# Patient Record
Sex: MALE | Employment: FULL TIME | URBAN - METROPOLITAN AREA
[De-identification: names, ages, dates, MRNs, and addresses within clinical notes are randomized per-mention and may not be internally consistent; named-entity substitution may affect disease eponyms.]

---

## 2018-06-12 ENCOUNTER — HOSPITAL ENCOUNTER (OUTPATIENT)
Dept: GENERAL RADIOLOGY | Age: 38
Discharge: HOME | End: 2018-06-12
Attending: FAMILY MEDICINE
Payer: COMMERCIAL

## 2018-06-12 DIAGNOSIS — M25.562 LEFT KNEE PAIN: ICD-10-CM

## 2018-06-12 DIAGNOSIS — M25.569 PAIN IN JOINT, LOWER LEG: ICD-10-CM

## 2018-06-12 PROCEDURE — 73562 X-RAY EXAM OF KNEE 3: CPT

## 2018-10-01 PROBLEM — M10.9 GOUT: Status: ACTIVE | Noted: 2018-10-01

## 2018-10-01 PROBLEM — E66.9 OBESITY: Status: ACTIVE | Noted: 2018-10-01

## 2018-10-01 PROBLEM — E11.9 TYPE II DIABETES MELLITUS (HCC): Status: ACTIVE | Noted: 2018-10-01

## 2018-10-01 RX ORDER — METFORMIN HYDROCHLORIDE 500 MG/1
TABLET ORAL 2 TIMES DAILY WITH MEALS
COMMUNITY
End: 2018-11-09 | Stop reason: SDUPTHER

## 2018-10-03 ENCOUNTER — OFFICE VISIT (OUTPATIENT)
Dept: FAMILY MEDICINE CLINIC | Age: 38
End: 2018-10-03

## 2018-10-03 VITALS
BODY MASS INDEX: 44.32 KG/M2 | OXYGEN SATURATION: 95 % | HEIGHT: 65 IN | TEMPERATURE: 98 F | SYSTOLIC BLOOD PRESSURE: 125 MMHG | WEIGHT: 266 LBS | HEART RATE: 86 BPM | DIASTOLIC BLOOD PRESSURE: 78 MMHG | RESPIRATION RATE: 20 BRPM

## 2018-10-03 DIAGNOSIS — M10.9 ACUTE GOUT INVOLVING TOE OF RIGHT FOOT, UNSPECIFIED CAUSE: Primary | ICD-10-CM

## 2018-10-03 DIAGNOSIS — E11.9 CONTROLLED TYPE 2 DIABETES MELLITUS WITHOUT COMPLICATION, WITHOUT LONG-TERM CURRENT USE OF INSULIN (HCC): ICD-10-CM

## 2018-10-03 PROBLEM — E66.01 OBESITY, MORBID (HCC): Status: ACTIVE | Noted: 2018-10-03

## 2018-10-03 RX ORDER — PREDNISONE 5 MG/1
TABLET ORAL
Qty: 21 TAB | Refills: 0 | Status: SHIPPED | OUTPATIENT
Start: 2018-10-03 | End: 2018-10-03 | Stop reason: SDUPTHER

## 2018-10-03 RX ORDER — INDOMETHACIN 50 MG/1
CAPSULE ORAL
Refills: 0 | COMMUNITY
Start: 2018-09-19 | End: 2018-10-03 | Stop reason: ALTCHOICE

## 2018-10-03 RX ORDER — PREDNISONE 5 MG/1
TABLET ORAL
Qty: 21 TAB | Refills: 0 | Status: SHIPPED | OUTPATIENT
Start: 2018-10-03 | End: 2018-11-09 | Stop reason: ALTCHOICE

## 2018-10-03 NOTE — PROGRESS NOTES
Assessment/Plan:     Diagnoses and all orders for this visit:    1. Acute gout involving toe of right foot, unspecified cause  -     URIC ACID  -     predniSONE (DELTASONE) 5 mg tablet; Day 1: 30 mg divided as 10 mg before breakfast, 5 mg at lunch, 5 mg at dinner, 10 mg at bedtime  Day 2: 5 mg at breakfast, 5 mg at lunch, 5 mg at dinner, 10 mg at bedtime  Day 3: 5 mg 4 times daily (with meals and at bedtime)  Day 4: 5 mg 3 times daily (breakfast, lunch, bedtime)  Day 5: 5 mg 2 time  - Worsening, start Prednisone taper. Check blood sugar at home. Eat gout friendly diet and avoid alcohol. Follow up in 1 month. 2. Controlled type 2 diabetes mellitus without complication, without long-term current use of insulin (HCC)  -     METABOLIC PANEL, BASIC  -     HEMOGLOBIN A1C WITH EAG  - Follow up in 1 month for diabetes education. Check blood sugar at home and take the metformin      Follow-up Disposition:  Return in about 4 weeks (around 10/31/2018) for Follow Up. Discussed expected course/resolution/complications of diagnosis in detail with patient.    Medication risks/benefits/costs/interactions/alternatives discussed with patient.    Pt was given after visit summary which includes diagnoses, current medications & vitals. Pt expressed understanding with the diagnosis and plan        Subjective:      Sandra Jenkins is a 41 y.o. male who presents for had concerns including Toe Pain. Here today for pain in right toe for about a month that is not getting better. [facility] and [facility]  Has a history of gout, this is the second episode. Denies an injury. Has a history of Type II diabetes but stopped taking metformin has he states he felt blood sugar was too low. Admits to not routinely checking blood sugar. Admits to some alcohol use prior and during this episode of pain in his toe.       Current Outpatient Prescriptions   Medication Sig Dispense Refill    predniSONE (DELTASONE) 5 mg tablet Day 1: 30 mg divided as 10 mg before breakfast, 5 mg at lunch, 5 mg at dinner, 10 mg at bedtime  Day 2: 5 mg at breakfast, 5 mg at lunch, 5 mg at dinner, 10 mg at bedtime  Day 3: 5 mg 4 times daily (with meals and at bedtime)  Day 4: 5 mg 3 times daily (breakfast, lunch, bedtime)  Day 5: 5 mg 2 time 21 Tab 0    metFORMIN (GLUCOPHAGE) 500 mg tablet Take  by mouth two (2) times daily (with meals). No Known Allergies    ROS:   Review of Systems   Constitutional: Negative for fever. Respiratory: Negative for shortness of breath. Cardiovascular: Negative for chest pain. Musculoskeletal: Positive for joint pain. Neurological: Negative for dizziness and headaches. Endo/Heme/Allergies: Negative for polydipsia. Objective:     Visit Vitals    /78    Pulse 86    Temp 98 °F (36.7 °C)    Resp 20    Ht 5' 5\" (1.651 m)    Wt 266 lb (120.7 kg)    SpO2 95%    BMI 44.26 kg/m2       Vitals and Nurse Documentation reviewed. Physical Exam   Constitutional: He is well-developed, well-nourished, and in no distress. No distress. HENT:   Head: Normocephalic and atraumatic. Cardiovascular: Normal rate, regular rhythm and normal heart sounds. Exam reveals no gallop and no friction rub. No murmur heard. Pulmonary/Chest: Effort normal and breath sounds normal. No respiratory distress. He has no wheezes. He has no rales. Musculoskeletal:        Feet:    Right great toe, is erythematous and TTP   Neurological: He is alert. Skin: He is not diaphoretic. Psychiatric: Affect normal.       No results found for this or any previous visit.

## 2018-10-03 NOTE — PROGRESS NOTES
Dave Romero is a 41 y.o. male  Chief Complaint   Patient presents with    Toe Pain     Gout f/u     Went to [facility] 2 weeks ago. 1. Have you been to the ER, urgent care clinic since your last visit? Hospitalized since your last visit? Yes  [facility]    2. Have you seen or consulted any other health care providers outside of the [address] [place] [address] since your last visit? Include any pap smears or colon screening.  no

## 2018-10-03 NOTE — PATIENT INSTRUCTIONS
Gota: Instrucciones de cuidado - [ Gout: Care Instructions ]  Instrucciones de cuidado    La gota es kevin forma de artritis causada por la acumulación de juliet de ácido úrico en kevin articulación. Causa ataques repentinos de dolor, hinchazón, enrojecimiento y rigidez, por lo general en kevin articulación, sobre todo en el dedo justyn del pie. La gota usualmente se presenta sin kevin causa evidente. Alan puede desencadenarse debido al consumo de alcohol (especialmente cerveza), mariscos y radha lin. Destiny ciertos medicamentos, adina diuréticos o aspirina, también puede ocasionar un ataque de [place]. Destiny los medicamentos roxy adina se los receten y asistir con regularidad a las consultas de seguimiento con tran médico pueden ayudarle a evitar ataques de gota en el futuro. La atención de seguimiento es kevin parte clave de tran tratamiento y seguridad. Asegúrese de hacer y acudir a todas las citas, y llame a tran médico si está teniendo problemas. También es kevin buena idea saber los resultados de irving exámenes y mantener kevin lista de los medicamentos que nova. ¿Cómo puede cuidarse en el hogar? · Si la articulación está hinchada, colóquese hielo o kevin compresa fría en la vianey brown 10 a 20 minutos cada vez. Póngase un paño conti entre el hielo y la piel. · Eleve el miembro adolorido sobre kevin almohada mientras se aplica hielo, o en cualquier momento que se siente o se acueste brown los 3 días siguientes. Trate de mantenerlo por encima del nivel del corazón. [place] ayudará a reducir la hinchazón. · Descanse las articulaciones adoloridas. Evite las actividades que impliquen poner peso o esfuerzo sobre las articulaciones por unos ori. [place] breves descansos en irving actividades regulares brown el día. · [employer] medicamentos exactamente adina le fueron recetados. Llame a tran médico si lebron estar teniendo problemas con tran medicamento.   · [employer] analgésicos (medicamentos para el dolor) exactamente según las indicaciones. ¨ Si el médico le recetó un analgésico, tómelo según las indicaciones. ¨ Si no está tomando un analgésico recetado, pregúntele a tran médico si puede gama horacio de [employer]. · Coma menos mariscos y radha lin. · Pregúntele a tran médico antes de gama alcohol. · Perder peso, si tiene sobrepeso, podría ayudarle a reducir los ataques de [place]. Alan no jose kevin [place]. La pérdida de un peso considerable en corto tiempo puede provocar un ataque de [place]. ¿Cuándo debe pedir ayuda? Llame a tran médico ahora mismo o busque atención médica inmediata si:    · Tiene fiebre.     · La articulación le duele tanto que no puede usarla.     · Tiene repentina e inexplicable hinchazón, enrojecimiento, calor o dolor intenso en kevin o más articulaciones.    Preste especial atención a los cambios en tran china y asegúrese de comunicarse con tran médico si:    · Tiene dolor en kevin articulación.     · Lovely síntomas empeoran o no mejoran después de 2 ó 3 días. ¿Dónde puede encontrar más información en inglés? Alex List a http://cindy-junito.info/. Hunter Henley [id number] en la búsqueda para aprender más acerca de \"Gota: Instrucciones de cuidado - [ Gout: Care Instructions ]. \"  Revisado: 10 octubre, 2017  Versión del contenido: 11.7  © 4926-9364 Healthwise, Incorporated. Las instrucciones de cuidado fueron adaptadas bajo licencia por [employer] (which disclaims liability or warranty for this information). Si usted tiene [place] afección médica o sobre estas instrucciones, siempre pregunte a tran profesional de [place]. Healthwise, Incorporated niega toda garantía o responsabilidad por tran uso de esta información.

## 2018-10-03 NOTE — MR AVS SNAPSHOT
[address] [place] [address] 
[phone] Patient: Priscila Andrea MRN: [id number] WVM:[id number] Visit Information Date & Time Provider Department Dept. Phone Encounter #  
 10/3/2018  8:30 AM Abhi Acuña NP [facility] Medicine [phone] [id number] Follow-up Instructions Return in about 4 weeks (around 10/31/2018) for Follow Up. Upcoming Health Maintenance Date Due HEMOGLOBIN A1C Q6M 1981 LIPID PANEL Q1 1981 FOOT EXAM Q1 5/30/1991 MICROALBUMIN Q1 5/30/1991 EYE EXAM RETINAL OR DILATED Q1 5/30/1991 Pneumococcal 19-64 Medium Risk (1 of 1 - PPSV23) 5/30/2000 DTaP/Tdap/Td series (1 - Tdap) 5/30/2002 Influenza Age 5 to Adult 8/1/2018 Allergies as of 10/3/2018  Review Complete On: 10/3/2018 By: Abhi Acuña NP No Known Allergies Current Immunizations  Never Reviewed No immunizations on file. Not reviewed this visit You Were Diagnosed With   
  
 Codes Comments Acute gout involving toe of right foot, unspecified cause    -  Primary ICD-10-CM: M10.9 ICD-9-CM: 274.01 Controlled type 2 diabetes mellitus without complication, without long-term current use of insulin ([facility].)     ICD-10-CM: E11.9 ICD-9-CM: 250.00 Vitals BP Pulse Temp Resp Height(growth percentile) Weight(growth percentile) 125/78 86 98 °F (36.7 °C) 20 5' 5\" (1.651 m) 266 lb (120.7 kg) SpO2 BMI Smoking Status 95% 44.26 kg/m2 Never Smoker BMI and BSA Data Body Mass Index Body Surface Area  
 44.26 kg/m 2 2.35 m 2 Preferred Pharmacy Pharmacy Name Phone [id number] [place] - [phone] [phone] Your Updated Medication List  
  
   
This list is accurate as of 10/3/18  9:16 AM.  Always use your most recent med list.  
  
  
  
  
 metFORMIN 500 mg tablet Commonly known as:  GLUCOPHAGE Take  by mouth two (2) times daily (with meals). predniSONE 5 mg tablet Commonly known as:  Arnie Osgood Day 1: 30 mg divided as 10 mg before breakfast, 5 mg at lunch, 5 mg at dinner, 10 mg at bedtime Day 2: 5 mg at breakfast, 5 mg at lunch, 5 mg at dinner, 10 mg at bedtime Day 3: 5 mg 4 times daily (with meals and at bedtime) Day 4: 5 mg 3 times daily (breakfast, lunch, bedtime) Day 5: 5 mg 2 time Prescriptions Printed Refills  
 predniSONE (DELTASONE) 5 mg tablet 0 Sig: Day 1: 30 mg divided as 10 mg before breakfast, 5 mg at lunch, 5 mg at dinner, 10 mg at bedtime Day 2: 5 mg at breakfast, 5 mg at lunch, 5 mg at dinner, 10 mg at bedtime Day 3: 5 mg 4 times daily (with meals and at bedtime) Day 4: 5 mg 3 times daily (breakfast, lunch, bedtime) Day 5: 5 mg 2 time Class: Print We Performed the Following HEMOGLOBIN A1C WITH EAG [37937 CPT(R)] METABOLIC PANEL, BASIC [58970 CPT(R)] URIC ACID [id number] CPT(R)] Follow-up Instructions Return in about 4 weeks (around 10/31/2018) for Follow Up. Patient Instructions Gota: Instrucciones de cuidado - [ Gout: Care Instructions ] Instrucciones de cuidado La gota es kevin forma de artritis causada por la acumulación de juliet de ácido úrico en kevin articulación. Causa ataques repentinos de dolor, hinchazón, enrojecimiento y rigidez, por lo general en kevin articulación, sobre todo en el dedo justyn del pie. La gota usualmente se presenta sin kevin causa evidente. Alan puede desencadenarse debido al consumo de alcohol (especialmente cerveza), mariscos y radha lin. Destiny ciertos medicamentos, adina diuréticos o aspirina, también puede ocasionar un ataque de [place]. Destiny los medicamentos roxy adina se los receten y asistir con regularidad a las consultas de seguimiento con tran médico pueden ayudarle a evitar ataques de gota en el futuro. La atención de seguimiento es kevin parte clave de tran tratamiento y seguridad. Asegúrese de hacer y acudir a todas las citas, y llame a tran médico si está teniendo problemas. También es kevin buena idea saber los resultados de irving exámenes y mantener kevin lista de los medicamentos que nova. Cómo puede cuidarse en el hogar? · Si la articulación está hinchada, colóquese hielo o kevin compresa fría en la vianey brown 10 a 20 minutos cada vez. Póngase un paño conti entre el hielo y la piel. · Eleve el miembro adolorido sobre kevin almohada mientras se aplica hielo, o en cualquier momento que se siente o se acueste brown los 3 días siguientes. Trate de mantenerlo por encima del nivel del corazón. [place] ayudará a reducir la hinchazón. · Descanse las articulaciones adoloridas. Evite las actividades que impliquen poner peso o esfuerzo sobre las articulaciones por unos ori. [place] breves descansos en irving actividades regulares brown el día. · [employer] medicamentos exactamente adina le fueron recetados. Llame a tran médico si lebron estar teniendo problemas con tran medicamento. · [employer] analgésicos (medicamentos para el dolor) exactamente según las indicaciones. ¨ Si el médico le recetó un analgésico, tómelo según las indicaciones. ¨ Si no está tomando un analgésico recetado, pregúntele a tran médico si puede gama horacio de [employer]. · Coma menos mariscos y radha lin. · Pregúntele a tran médico antes de gama alcohol. · Perder peso, si tiene sobrepeso, podría ayudarle a reducir los ataques de [place]. Alan no jose kevin [place]. La pérdida de un peso considerable en corto tiempo puede provocar un ataque de [place]. Cuándo debe pedir ayuda? Llame a tran médico ahora mismo o busque atención médica inmediata si: 
  · Tiene fiebre.  
  · La articulación le duele tanto que no puede usarla.  
  · Tiene repentina e inexplicable hinchazón, enrojecimiento, calor o dolor intenso en kevin o más articulaciones.  Preste especial atención a los cambios en tran china y asegúrese de comunicarse con tran médico si: 
  · Tiene dolor en kevin articulación.  
  · Lovely síntomas empeoran o no mejoran después de 2 ó 3 días. Dónde puede encontrar más información en inglés? Yue Benitez a http://cindy-junito.info/. Marge Seip [id number] en la búsqueda para aprender más acerca de \"Gota: Instrucciones de cuidado - [ Gout: Care Instructions ]. \" 
Revisado: 10 octubre, 2017 Versión del contenido: 11.7 © 1435-4409 Healthwise, Incorporated. Las instrucciones de cuidado fueron adaptadas bajo licencia por [employer] (which disclaims liability or warranty for this information). Si usted tiene [place] afección médica o sobre estas instrucciones, siempre pregunte a tran profesional de china. Healthwise, Incorporated niega toda garantía o responsabilidad por tran uso de esta información. Introducing [facility]! [facility] introduces Independent IP patient portal. Now you can access parts of your medical record, email your doctor's office, and request medication refills online. 1. In your internet browser, go to [email or web address] 2. Click on the First Time User? Click Here link in the Sign In box. You will see the New Member Sign Up page. 3. Enter your Independent IP Access Code exactly as it appears below. You will not need to use this code after youve completed the sign-up process. If you do not sign up before the expiration date, you must request a new code. · Independent IP Access Code: [email or web address] Expires: 12/10/2018  5:23 AM 
 
4. Enter the last four digits of your Social Security Number (xxxx) and Date of Birth (mm/dd/yyyy) as indicated and click Submit. You will be taken to the next sign-up page. 5. Create a Independent IP ID. This will be your PlayGigahart login ID and cannot be changed, so think of one that is secure and easy to remember. 6. Create a Treeveo password. You can change your password at any time. 7. Enter your Password Reset Question and Answer. This can be used at a later time if you forget your password. 8. Enter your e-mail address. You will receive e-mail notification when new information is available in [address] 9. Click Sign Up. You can now view and download portions of your medical record. 10. Click the Download Summary menu link to download a portable copy of your medical information. If you have questions, please visit the Frequently Asked Questions section of the Treeveo website. Remember, Treeveo is NOT to be used for urgent needs. For medical emergencies, dial 911. Now available from your iPhone and Android! Please provide this summary of care documentation to your next provider. Your primary care clinician is listed as Kamille Hedrick. If you have any questions after today's visit, please call [phone].

## 2018-10-04 LAB
BUN SERPL-MCNC: 13 MG/DL (ref 6–20)
BUN/CREAT SERPL: 15 (ref 9–20)
CALCIUM SERPL-MCNC: 9.7 MG/DL (ref 8.7–10.2)
CHLORIDE SERPL-SCNC: 101 MMOL/L (ref 96–106)
CO2 SERPL-SCNC: 24 MMOL/L (ref 20–29)
CREAT SERPL-MCNC: 0.88 MG/DL (ref 0.76–1.27)
EST. AVERAGE GLUCOSE BLD GHB EST-MCNC: 143 MG/DL
GLUCOSE SERPL-MCNC: 111 MG/DL (ref 65–99)
HBA1C MFR BLD: 6.6 % (ref 4.8–5.6)
POTASSIUM SERPL-SCNC: 4.9 MMOL/L (ref 3.5–5.2)
SODIUM SERPL-SCNC: 137 MMOL/L (ref 134–144)
URATE SERPL-MCNC: 9 MG/DL (ref 3.7–8.6)

## 2018-10-04 NOTE — PROGRESS NOTES
Please let patient know his uric acid level was high indicating Gout. His A1c was high so please start taking the Metformin again and lets draw labs in 3 months. Work on diet and exercise.

## 2018-10-05 NOTE — PROGRESS NOTES
Spoke with patient's wife on HIPAA and informed of lab results and to inform patient to begin to take metformin again. Wife verbalized an understanding and stated will inform patient.

## 2018-11-09 ENCOUNTER — OFFICE VISIT (OUTPATIENT)
Dept: FAMILY MEDICINE CLINIC | Age: 38
End: 2018-11-09

## 2018-11-09 VITALS
BODY MASS INDEX: 44.15 KG/M2 | TEMPERATURE: 98 F | OXYGEN SATURATION: 97 % | HEIGHT: 65 IN | HEART RATE: 71 BPM | SYSTOLIC BLOOD PRESSURE: 134 MMHG | WEIGHT: 265 LBS | RESPIRATION RATE: 20 BRPM | DIASTOLIC BLOOD PRESSURE: 83 MMHG

## 2018-11-09 DIAGNOSIS — M1A.4710 OTHER SECONDARY CHRONIC GOUT OF RIGHT FOOT WITHOUT TOPHUS: Primary | ICD-10-CM

## 2018-11-09 DIAGNOSIS — E11.9 CONTROLLED TYPE 2 DIABETES MELLITUS WITHOUT COMPLICATION, WITHOUT LONG-TERM CURRENT USE OF INSULIN (HCC): ICD-10-CM

## 2018-11-09 RX ORDER — COLCHICINE 0.6 MG/1
0.6 TABLET ORAL 2 TIMES DAILY
Qty: 14 TAB | Refills: 1 | Status: SHIPPED | OUTPATIENT
Start: 2018-11-09 | End: 2018-11-16

## 2018-11-09 RX ORDER — ALLOPURINOL 100 MG/1
TABLET ORAL
Qty: 291 TAB | Refills: 0 | Status: SHIPPED | OUTPATIENT
Start: 2018-11-09 | End: 2019-02-21

## 2018-11-09 RX ORDER — METFORMIN HYDROCHLORIDE 500 MG/1
500 TABLET ORAL 2 TIMES DAILY WITH MEALS
Qty: 180 TAB | Refills: 1 | Status: SHIPPED | OUTPATIENT
Start: 2018-11-09 | End: 2019-05-25 | Stop reason: SDUPTHER

## 2018-11-09 NOTE — PROGRESS NOTES
Priyank Anne is a 41 y.o. male      Chief Complaint   Patient presents with    Follow-up     lab rsults          1. Have you been to the ER, urgent care clinic since your last visit? Hospitalized since your last visit? No      2. Have you seen or consulted any other health care providers outside of the [address] [place] [address] since your last visit? Include any pap smears or colon screening. No    I reviewed the patient's medical history, the resident's findings on physical examination, the patient's diagnoses, and treatment plan as documented in the resident note. I concur with the treatment plan as documented. Additional suggestions noted.

## 2018-11-09 NOTE — PATIENT INSTRUCTIONS
Gota: Instrucciones de cuidado - [ Gout: Care Instructions ] Instrucciones de cuidado La gota es kevin forma de artritis causada por la acumulación de juliet de ácido úrico en kevin articulación. Causa ataques repentinos de dolor, hinchazón, enrojecimiento y rigidez, por lo general en kevin articulación, sobre todo en el dedo justyn del pie. La gota usualmente se presenta sin kevin causa evidente. Alan puede desencadenarse debido al consumo de alcohol (especialmente cerveza), mariscos y radha lin. Destiny ciertos medicamentos, adina diuréticos o aspirina, también puede ocasionar un ataque de [place]. Destiny los medicamentos roxy adina se los receten y asistir con regularidad a las consultas de seguimiento con tran médico pueden ayudarle a evitar ataques de gota en el futuro. La atención de seguimiento es kevin parte clave de tran tratamiento y seguridad. Asegúrese de hacer y acudir a todas las citas, y llame a tran médico si está teniendo problemas. También es kevin buena idea saber los resultados de irving exámenes y mantener kevin lista de los medicamentos que nova. Cómo puede cuidarse en el hogar? · Si la articulación está hinchada, colóquese hielo o kevin compresa fría en la vianey brown 10 a 20 minutos cada vez. Póngase un paño conti entre el hielo y la piel. · Eleve el miembro adolorido sobre kevin almohada mientras se aplica hielo, o en cualquier momento que se siente o se acueste brown los 3 días siguientes. Trate de mantenerlo por encima del nivel del corazón. [place] ayudará a reducir la hinchazón. · Descanse las articulaciones adoloridas. Evite las actividades que impliquen poner peso o esfuerzo sobre las articulaciones por unos ori. [place] breves descansos en irving actividades regulares borwn el día. · [employer] medicamentos exactamente adina le fueron recetados. Llame a tran médico si lebron estar teniendo problemas con tran medicamento.  
· [employer] analgésicos (medicamentos para el dolor) exactamente según las indicaciones. ? Si el médico le recetó un analgésico, tómelo según las indicaciones. ? Si no está tomando un analgésico recetado, pregúntele a salgado médico si puede gama horacio de [employer]. · Coma menos mariscos y radha lin. · Pregúntele a salgado médico antes de gama alcohol. · Perder peso, si tiene sobrepeso, podría ayudarle a reducir los ataques de [place]. Alan no jose kevin Mickey Ender. La pérdida de un peso considerable en corto tiempo puede provocar un ataque de [place]. Cuándo debe pedir ayuda? Llame a salgado médico ahora mismo o busque atención médica inmediata si: 
  · Tiene fiebre.  
  · La articulación le duele tanto que no puede usarla.  
  · Tiene repentina e inexplicable hinchazón, enrojecimiento, calor o dolor intenso en kevin o más articulaciones.  
 Preste especial atención a los cambios en salgado china y asegúrese de comunicarse con salgado médico si: 
  · Tiene dolor en kevin articulación.  
  · Lovely síntomas empeoran o no mejoran después de 2 ó 3 días. Dónde puede encontrar más información en inglés? Brandy Rodriguez a http://cindy-junito.info/. Tc Carmen G044 en la búsqueda para aprender más acerca de \"Gota: Instrucciones de cuidado - [ Gout: Care Instructions ]. \" 
Revisado: 11 junio, 2018 Versión del contenido: 11.8 © 8851-4152 Healthwise, Incorporated. Las instrucciones de cuidado fueron adaptadas bajo licencia por [employer] (which disclaims liability or warranty for this information). Si usted tiene [place] afección médica o sobre estas instrucciones, siempre pregunte a salgado profesional de china. Healthwise, Incorporated niega toda garantía o responsabilidad por salgado uso de esta información. Dieta restringida en purinas: Instrucciones de cuidado - [ Purine-Restricted Diet: Care Instructions ] Instrucciones de cuidado Vida Bruna son sustancias que se encuentran en ciertos alimentos.  Salgado cuerpo transforma las purinas en [facility] niv alto de Catina Sha puede causar gota, kevin forma de artritis que provoca dolor e inflamación en las articulaciones. Usted podría ayudar a controlar la cantidad de ácido úrico en tran cuerpo limitando los alimentos con alto contenido en purinas de tran Allen Solid. La atención de seguimiento es kevin parte clave de tran tratamiento y seguridad. Asegúrese de hacer y acudir a todas las citas, y llame a tran médico si está teniendo problemas. También es kevin buena idea saber los resultados de irving exámenes y mantener kevin lista de los medicamentos que nova. Cómo puede cuidarse en el hogar? · Planifique irving comidas y refrigerios con alimentos que maximus bajos en purinas y seguros para usted. Estos alimentos incluyen: ? Verduras verdes y tomates. ? Frutas. ? Bee, arroz y cereales integrales. ? Huevos, mantequilla de cacahuate (maní) y nueces. ? Elizabeth  y otros productos lácteos semidescremados. ? Palomitas de maíz (\"popcorn\"). ? Postres de gelatina, chocolate, cacao, así adina tartas y dulces en pequeñas cantidades. · Puede comer alimentos que maximus medianamente ricos en purinas, paulette solo de vez en cuando. Estos alimentos incluyen: 
? Legumbres, adina frijoles (habichuelas) secos y arvejas (chícharos) secas. Puede comer 1 taza de legumbres cocidas al día. ? Espárragos, coliflor, espinacas, hongos y arvejas (chícharos). ? Pescados y mariscos (que no maximus mariscos muy ricos en purinas). ? Mesha, salvado de jonnathan y germen de [place]. · Limite los alimentos muy ricos en purinas adina: 
? Vísceras, adina hígado, riñones, mollejas y sesos. ? Ingrid, incluyendo tocino, res, cerdo y hernandez. ? Ingrid de animales de caza y cualquier otro tipo de carne en grandes cantidades. ? Aminta Harrison, cole, jessenia y vieiras. ? Salsa de carne (\"gravy\"). ? Kisha Heading. Dónde puede encontrar más información en inglés? Onita Savers a http://cindy-junito.[email or web address]  
Escriba F448 en la búsqueda para aprender más acerca de \"Dieta restringida en purinas: Instrucciones de cuidado - [ Purine-Restricted Diet: Care Instructions ]. \" 
Revisado: [address], 2018 Versión del contenido: 11.8 © 4132-5095 Healthwise, Incorporated. Las instrucciones de cuidado fueron adaptadas bajo licencia por [employer] Connections (which disclaims liability or warranty for this information). Si usted tiene [place] afección médica o sobre estas instrucciones, siempre pregunte a salgado profesional de china. Healthwise, Incorporated niega toda garantía o responsabilidad por salgado uso de esta información. Alopurinol (Por la boca) Cony Drier y cálculos del [place]. Reduce la cantidad de ácido úrico en la last. Danial(s) : Zyloprim Existen muchas otras marcas de Due[place]. Margo medicamento no debe ser usado cuando:  
Margo medicamento no es adecuado para todas las personas. No lo use si ha tenido kevin reacción alérgica al alopurinol. [place] de usar magro medicamento:  
Louana Mani · Salgado médico le indicara cuanto medicamento necesita usar. No use más medicamento de lo indicado. · Siga tomando Jer, aunque parezca que no le hace efecto y usted esta tomando otros medicamentos para los ataques de la gota. Los ataques se irán acortando y serán menos frecuentes cuando haya tomado alopurinol por varios meses. · [place] de 10 a 12 vasos llenos de agua diarios a menos que el médico le indique lo contrario. · Usted puede gama alopurinol después de las comidas para evitar el malestar estomacal. 
· Si Darolyn Slicker dosis: Si olvida kevin dosis de salgado medicamento, tómelo lo más pronto posible. Si es epi la hora para salgado próxima dosis, espere hasta entonces para gama salgado dosis regular. No use medicamento adicional para reponer la dosis olvidada. · Guarde el medicamento en un recipiente cerrado a temperatura ambiente y alejado del calor, la humedad y la isidoro directa. Medicamentos y alimentos que debe evitar: Consulte con tran médico o farmacéutico antes de usar Mary Lou Baxter, incluyendo los que compra sin receta médica, las vitaminas y los productos herbales. · Algunos medicamentos y alimentos podrían afectar la función de alopurinol. Informe a tran médico si está usando alguno de los siguientes medicamentos: ¨ Un anticoagulante, adina la warfarina o dicumarol ¨ Un diurético 
¨ Ampicilina, amoxicilina, ciclosporina ¨ Mercaptopurina, azatioprina Madhuri Shark · No tome cantidades grandes de vitamina C mientras está tomando alopurinol. Precauciones brown el uso de Be medicamento: · Informe a tran médico si usted está embarazada o amamantando, o si tiene diabetes, presión arterial steven, insuficiencia cardíaca, convulsiones, enfermedad del riñón, del hígado, cáncer u otros problemas médicos. · Zeenat medicamento puede causar somnolencia. No maneje ni jose otra tarea que pueda ser peligrosa hasta que sepa cómo le afecta zeenat medicamento. · Guarde todos los medicamentos fuera del alcance de los niños. Nunca comparta irving medicamentos con [employer]. Efectos secundarios que pueden presentarse brown el uso de zeenat medicamento:  
Consulte inmediatamente con el médico si nota cualquiera de estos efectos secundarios: 
· Reacción alérgica: Comezón o ronchas, hinchazón del michael o las karen, hinchazón u hormigueo en la boca o garganta, opresión en el pecho, dificultad para respirar · Ampollas, despellejamiento, sarpullido segovia en la piel. · Irritación de pérez · Dolor en las articulaciones o en los músculos · Dolor cuando usted [place] en tran [place] · Piel u ojos amarillos Consulte con el médico si nota los siguientes efectos secundarios menos graves: · Somnolencia · Náuseas, vómitos, o diarrea Consulte con el médico si nota otros efectos secundarios que lebron son causados por zeenat medicamento. Llame a tran médico para consultarle Claudy.  Willyaron Talon puede notificar irving efectos secundarios al FDA al 1-800-FDA-1088. © 2017 [address] Information is for End User's use only and may not be sold, redistributed or otherwise used for commercial purposes. Esta información es sólo para uso en educación. Tran intención no es darle un consejo médico sobre enfermedades o tratamientos. Colsulte con tran Ellerslie Ke farmacéutico antes de seguir cualquier régimen médico para saber si es seguro y efectivo para usted. Colchicina (Por la boca) [place] para prevenir los ataques de [place]. También para tratar la fiebre mediterránea familiar (FMF). Danial(s) : Sandrea Ashleigh Existen muchas otras marcas de Dueñas. Zeenat medicamento no debe ser usado cuando:  
Zeenat medicamento generalmente se considera seguro para la mayoría de [employer]. Consulte con tran médico si tiene alguna inquietud. [place] de usar zeenat medicamento:  
Daphene Jey · [place] irvign medicamentos adina se le haya indicado. Es probable que sea necesario cambiar tran dosis varias veces hasta encontrar la que funciona mejor para usted. · Continúe usando zeenat medicamento hasta completar el tratamiento, aunque usted se sienta mejor después de las primeras dosis. · Jer debe venir con Rosa Hussar Guía del medicamento. Solicite kevin copia con tran farmacéutico en jhonatan de no tener la guía. · Si olvida kevin dosis: Si olvida kevin dosis de tran medicamento, tómelo lo más pronto posible. Si es epi la hora para tran próxima dosis, espere hasta entonces para gama tran dosis regular. No use medicamento adicional para reponer la dosis olvidada. · Guarde el medicamento en un recipiente cerrado a temperatura ambiente y alejado del calor, la humedad y la isidoro directa. Medicamentos y [employer] que debe evitar:  
Consulte con tran médico o farmacéutico antes de usar cualquier medicamento, incluyendo los que compra sin receta médica, las vitaminas y los productos herbales. · Algunos alimentos y medicamentos pueden afectar la eficacia de la colchicina. Informe a tran médico si está usando cualquiera de los siguientes: 
¨ Aprepitant, ciclosporina, digoxina, diltiazem, nefazodona, ranolazina, verapamilo ¨ Medicamento para tratar el Letta  ¨ Medicamento para tratar Magdaleno Kothari infección (adina claritromicina, eritromicina, itraconazol, ketoconazol) ¨ Medicamentos para bajar el colesterol (adina [place], [place], [place], ácido de [place], [place], gemfibrozilo, lovastatina, pravastatina, simvastatina) · No coma toronja ni tome jugo de toronja mientras esté . Precauciones brown el uso de Kelly medicamento: · Informe a tran médico si usted [employer] o [facility], o si tiene [place] riñones o hígado, anemia, problemas de sangrado o problemas musculares. · Zeenat medicamento puede causar problemas musculares. · Zeenat medicamento podría causarle sangrado, moretones, y que desarrolle infecciones con más facilidad. [place] precauciones para prevenir enfermedades y lesiones. Lávese las karen frecuentemente. · El médico solicitará exámenes de laboratorio brown las citas de rutina para revisar los efectos de Jer. Asista a todas irving citas. · Guarde todos los medicamentos fuera del alcance de los niños. Nunca comparta irving medicamentos con Leo. Efectos secundarios que pueden presentarse brown el uso de zeenat medicamento:  
Consulte inmediatamente con el médico si nota cualquiera de estos efectos secundarios: 
· Reacción alérgica: Comezón o ronchas, hinchazón del michael o las karen, hinchazón u hormigueo en la boca o garganta, opresión en el pecho, dificultad para respirar · Heces con last o negras alquitranadas, orina Iona Dear oscuro · Carol Nip, escalofríos, tos, garganta irritada, brenden en el cuerpo · Dolor, sensibilidad o debilidad muscular · Entumecimiento u hormigueo en irving karen o pies · Labios, lengua o rubi pálidas o grises · Diarrea o vómitos intensos · Sangrados, moretones o debilidad inusuales. Consulte con el médico si nota los siguientes efectos secundarios menos graves: · Diarrea leve o vómitos, náusea, dolor o calambres estomacales Consulte con el médico si nota otros efectos secundarios que lebron son causados por margo medicamento. Llame a salgado médico para consultarle Claudy. Usted puede notificar irving efectos secundarios al FDA al 6-417-RGJ-0258. © 2017 [address] Juan [address] Castelan Information is for End User's use only and may not be sold, redistributed or otherwise used for commercial purposes. Esta información es sólo para uso en educación. Salgado intención no es darle un consejo médico sobre enfermedades o tratamientos. Colsulte con salgado Dio Hang farmacéutico antes de seguir cualquier régimen médico para saber si es seguro y efectivo para usted.

## 2018-11-09 NOTE — PROGRESS NOTES
Subjective  Priyank Anne is an 41 y.o. male who presents for gout    HPI  Gout  Duration: Dec, 2016  Location: right foot, left foot 1st mtp joint  Severity: 6/10  Number of Attacks Per Month:  15  Current Gout Attack: Started 8/27/18; he was seen on 10/4/18 and he was prescribed a prednisone taper. He states that his symptoms improved while on the taper, but then came back after he stopped. Review of Systems   Constitutional: Negative for appetite change. Negative for activity change, chills, diaphoresis and fatigue. Respiratory: Negative for apnea and chest tightness. Cardiovascular: Negative for chest pain and leg swelling. Allergies - reviewed:   No Known Allergies      Medications - reviewed:   Current Outpatient Medications   Medication Sig    colchicine 0.6 mg tablet Take 1 Tab by mouth two (2) times a day for 7 days.  allopurinol (ZYLOPRIM) 100 mg tablet Take 1 Tab by mouth daily for 7 days, THEN 2 Tabs daily for 7 days, THEN 3 Tabs daily for 90 days.  metFORMIN (GLUCOPHAGE) 500 mg tablet Take 1 Tab by mouth two (2) times daily (with meals). No current facility-administered medications for this visit. Past Medical History - reviewed:  Past Medical History:   Diagnosis Date    Gout     Hyperlipemia     Impaired glucose tolerance          Past Surgical History - reviewed:   No past surgical history on file.       Social History - reviewed:  Social History     Socioeconomic History    Marital status:      Spouse name: Not on file    Number of children: Not on file    Years of education: Not on file    Highest education level: Not on file   Social Needs    Financial resource strain: Not on file    Food insecurity - worry: Not on file    Food insecurity - inability: Not on file   [employer] needs - medical: Not on file   [employer] needs - non-medical: Not on file   Occupational History    Not on file   Tobacco Use    Smoking status: Never Smoker    Smokeless tobacco: Never Used   Substance and Sexual Activity    Alcohol use: No    Drug use: No    Sexual activity: Yes   Other Topics Concern    Not on file   Social History Narrative    Not on file         Family History - reviewed:  Family History   Problem Relation Age of Onset    Hypertension Mother     No Known Problems Father          Visit Vitals  /83 (BP 1 Location: Right arm, BP Patient Position: Sitting)   Pulse 71   Temp 98 °F (36.7 °C) (Oral)   Resp 20   Ht 5' 5\" (1.651 m)   Wt 265 lb (120.2 kg)   SpO2 97%   BMI 44.10 kg/m²       Physical Exam   Constitutional: well-developed and well-nourished. HENT:   Head: Normocephalic and atraumatic. Eyes: Conjunctivae are normal. Pupils are equal, round, and reactive to light. Neck: Normal range of motion. Neck supple. No JVD present. No tracheal deviation present. No thyromegaly present. Cardiovascular: Normal rate, regular rhythm and normal heart sounds. Exam reveals no friction rub. No murmur heard. Pulmonary/Chest: Effort normal and breath sounds normal. No stridor. No respiratory distress. no wheezes. no rales. no tenderness. Feet: Inspection: no tophus or erythema blt  ROM: full passive and active blt  Strength: full strength in all directions blt  Palpation: mild TTP over right 1st MTP joint          Assessment/Plan  1. 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 Patient improved a little with prednisone taper, but symptoms returned. Will provide colchicine for 1 week then once patient has been without symptoms for at least 1 week, will start Allopurinol.  - Discussed recommendations for avoiding alcohol and foods that worsen gout. - colchicine 0.6 mg tablet; Take 1 Tab by mouth two (2) times a day for 7 days. Dispense: 14 Tab; Refill: 1  - allopurinol (ZYLOPRIM) 100 mg tablet; Take 1 Tab by mouth daily for 7 days, THEN 2 Tabs daily for 7 days, THEN 3 Tabs daily for 90 days. Dispense: 291 Tab; Refill: 0  -discussed precautions with allopurinol and advised patient to stop immediately if he develops any rash and either go to ED or call office    2. Controlled type 2 diabetes mellitus without complication, without long-term current use of insulin ([address]): BG controlled, advised patient to continue lifestyle modifications  - discussed obtaining TEDDY  - metFORMIN (GLUCOPHAGE) 500 mg tablet; Take 1 Tab by mouth two (2) times daily (with meals). Dispense: 180 Tab; Refill: 1          Follow-up Disposition:  Return in about 3 months (around 2/9/2019) for dibetes and gout. I have discussed the diagnosis with the patient and the intended plan as seen in the above orders. Patient verbalized understanding of the plan and agrees with the plan. The patient has received an after-visit summary and questions were answered concerning future plans. I have discussed medication side effects and warnings with the patient as well. Informed patient to return to the office if new symptoms arise.         Abdirizak Major MD  Family Medicine Resident

## 2018-12-31 RX ORDER — COLCHICINE 0.6 MG/1
0.6 TABLET ORAL DAILY
COMMUNITY
End: 2018-12-31 | Stop reason: SDUPTHER

## 2019-01-02 RX ORDER — COLCHICINE 0.6 MG/1
0.6 TABLET ORAL
Qty: 10 TAB | Refills: 0 | Status: SHIPPED | OUTPATIENT
Start: 2019-01-02 | End: 2019-06-05 | Stop reason: SDUPTHER

## 2019-05-25 DIAGNOSIS — E11.9 CONTROLLED TYPE 2 DIABETES MELLITUS WITHOUT COMPLICATION, WITHOUT LONG-TERM CURRENT USE OF INSULIN (HCC): ICD-10-CM

## 2019-05-28 RX ORDER — METFORMIN HYDROCHLORIDE 500 MG/1
TABLET ORAL
Qty: 180 TAB | Refills: 1 | Status: SHIPPED | OUTPATIENT
Start: 2019-05-28 | End: 2020-04-13 | Stop reason: SDUPTHER

## 2019-06-05 RX ORDER — COLCHICINE 0.6 MG/1
0.6 TABLET ORAL
Qty: 10 TAB | Refills: 1 | Status: SHIPPED | OUTPATIENT
Start: 2019-06-05 | End: 2020-08-07 | Stop reason: SDUPTHER

## 2020-02-03 DIAGNOSIS — E11.9 CONTROLLED TYPE 2 DIABETES MELLITUS WITHOUT COMPLICATION, WITHOUT LONG-TERM CURRENT USE OF INSULIN (HCC): ICD-10-CM

## 2020-02-04 RX ORDER — METFORMIN HYDROCHLORIDE 500 MG/1
TABLET ORAL
Qty: 90 TAB | Refills: 0 | OUTPATIENT
Start: 2020-02-04

## 2020-04-13 ENCOUNTER — VIRTUAL VISIT (OUTPATIENT)
Dept: FAMILY MEDICINE CLINIC | Age: 40
End: 2020-04-13

## 2020-04-13 VITALS — BODY MASS INDEX: 44.15 KG/M2 | HEIGHT: 65 IN | WEIGHT: 265 LBS

## 2020-04-13 DIAGNOSIS — M25.561 CHRONIC PAIN OF BOTH KNEES: ICD-10-CM

## 2020-04-13 DIAGNOSIS — E11.9 CONTROLLED TYPE 2 DIABETES MELLITUS WITHOUT COMPLICATION, WITHOUT LONG-TERM CURRENT USE OF INSULIN (HCC): ICD-10-CM

## 2020-04-13 DIAGNOSIS — M25.562 CHRONIC PAIN OF BOTH KNEES: ICD-10-CM

## 2020-04-13 DIAGNOSIS — E11.9 CONTROLLED TYPE 2 DIABETES MELLITUS WITHOUT COMPLICATION, WITHOUT LONG-TERM CURRENT USE OF INSULIN (HCC): Primary | ICD-10-CM

## 2020-04-13 DIAGNOSIS — G89.29 CHRONIC PAIN OF BOTH KNEES: ICD-10-CM

## 2020-04-13 RX ORDER — METFORMIN HYDROCHLORIDE 500 MG/1
TABLET ORAL
Qty: 180 TAB | Refills: 1 | Status: SHIPPED | OUTPATIENT
Start: 2020-04-13 | End: 2020-10-09

## 2020-04-13 RX ORDER — NAPROXEN 500 MG/1
500 TABLET ORAL
Qty: 30 TAB | Refills: 2 | Status: SHIPPED | OUTPATIENT
Start: 2020-04-13

## 2020-04-13 NOTE — PROGRESS NOTES
Identified pt with two pt identifiers(name and ). Reviewed record in preparation for visit and have obtained necessary documentation. Chief Complaint   Patient presents with    Medication Refill     bp medication METFORMIN        Health Maintenance Due   Topic    Pneumococcal 0-64 years (1 of 1 - PPSV23)    Foot Exam Q1     MICROALBUMIN Q1     Eye Exam Retinal or Dilated     Lipid Screen     DTaP/Tdap/Td series (1 - Tdap)    A1C test (Diabetic or Prediabetic)        Visit Vitals  Height 5' 5\" (1.651 m)   Weight 265 lb (120.2 kg)   Body Mass Index 44.10 kg/m²         Coordination of Care Questionnaire:  :   1) Have you been to an emergency room, urgent care, or hospitalized since your last visit? NO      2. Have seen or consulted any other health care provider since your last visit? NO       Directive/Patient is accompanied by SELF I have received verbal consent from Ubaldo Meckel to discuss any/all medical information while they are present in the room.

## 2020-04-13 NOTE — PROGRESS NOTES
Consent: Myrna Reyes, who was seen by synchronous (real-time) audio-video technology, and/or his healthcare decision maker, is aware that this patient-initiated, Telehealth encounter on 4/13/2020 is a billable service, with coverage as determined by his insurance carrier. He is aware that he may receive a bill and has provided verbal consent to proceed: Yes. Assessment & Plan:   Diagnoses and all orders for this visit:    1. Controlled type 2 diabetes mellitus without complication, without long-term current use of insulin (HCC)  -     HEMOGLOBIN A1C WITH EAG; Future  -     METABOLIC PANEL, COMPREHENSIVE; Future  -     CBC WITH AUTOMATED DIFF; Future  -     metFORMIN (GLUCOPHAGE) 500 mg tablet; TOME CARLENE TABLETA DOS VECES AL SANAM CON LAS COMIDAS  - Presumed stable, will need to schedule visit once COVID-19 is over. Refill today. Labs ordered, will mail to him to get done. [facility] address given to patient    2. Chronic pain of both knees  -     naproxen (NAPROSYN) 500 mg tablet; Take 1 Tab by mouth two (2) times daily as needed for Pain. - Worsening, start naproxyn today as directed, if needing everyday will consider getting updated images and possible joint injections. Work on weight loss. I spent at least 15 minutes with this established patient, and >50% of the time was spent counseling and/or coordinating care regarding diabetes and bilateral knee pain  712  Subjective:   Myrna Reyes is a 46 y.o. male who was seen for Medication Refill (bp medication METFORMIN)    Here today for refill of Metformin. Takes 500mg BID. Does not check BG at home. Was last seen in 10/2018. Last A1c was 6.6. States diet is the same     Bilateral ankle pain  States for about 4-5 months has had bilateral knee pain, getting worse. Seen in June in 2018 and had bilateral xrays. Mild degenerative changes present. States naproxen works. Tried tylenol and that did not work.      Prior to Admission medications    Medication Sig Start Date End Date Taking? Authorizing Provider   naproxen (NAPROSYN) 500 mg tablet Take 1 Tab by mouth two (2) times daily as needed for Pain. 4/13/20  Yes Rosa M Garcia NP   metFORMIN (GLUCOPHAGE) 500 mg tablet TOME CARLENE TABLETA DOS VECES AL SANAM CON LAS COMIDAS 4/13/20  Yes Rosa M Garcia NP   colchicine 0.6 mg tablet Take 1 Tab by mouth daily as needed (gout flare). 6/5/19  Yes Rosa M Garcia NP   metFORMIN (GLUCOPHAGE) 500 mg tablet TOME CARLENE TABLETA DOS VECES AL SANAM CON LAS COMIDAS 5/28/19 4/13/20  Joseph Peña MD     No Known Allergies    Patient Active Problem List   Diagnosis Code    Gout M10.9    Obesity E66.9    Type II diabetes mellitus ([address] E11.9    Obesity, morbid ([address] E66.01       Review of Systems   Constitutional: Negative for chills, fever and malaise/fatigue. Eyes: Negative for blurred vision. Respiratory: Negative for cough and shortness of breath. Cardiovascular: Negative for chest pain, palpitations and leg swelling. Musculoskeletal: Positive for joint pain. Neurological: Negative for dizziness and headaches.            Objective:   Vital Signs: (As obtained by patient/caregiver at home)  Visit Vitals  Ht 5' 5\" (1.651 m)   Wt 265 lb (120.2 kg)   BMI 44.10 kg/m²        [INSTRUCTIONS:  \"[x]\" Indicates a positive item  \"[]\" Indicates a negative item  -- DELETE ALL ITEMS NOT EXAMINED]    Constitutional: [x] Appears well-developed and well-nourished [x] No apparent distress      [] Abnormal -     Mental status: [x] Alert and awake  [x] Oriented to person/place/time [x] Able to follow commands    [] Abnormal -     Eyes:   EOM    [x]  Normal    [] Abnormal -   Sclera  [x]  Normal    [] Abnormal -          Discharge [x]  None visible   [] Abnormal -     HENT: [x] Normocephalic, atraumatic  [] Abnormal -   [x] Mouth/Throat: Mucous membranes are moist    External Ears [x] Normal  [] Abnormal -    Neck: [x] No visualized mass [] Abnormal - Pulmonary/Chest: [x] Respiratory effort normal   [x] No visualized signs of difficulty breathing or respiratory distress        [] Abnormal -      Musculoskeletal:   [x] Normal gait with no signs of ataxia         [x] Normal range of motion of neck        [] Abnormal -     Neurological:        [x] No Facial Asymmetry (Cranial nerve 7 motor function) (limited exam due to video visit)          [x] No gaze palsy        [] Abnormal -          Skin:        [x] No significant exanthematous lesions or discoloration noted on facial skin         [] Abnormal -            Psychiatric:       [x] Normal Affect [] Abnormal -        [x] No Hallucinations    Other pertinent observable physical exam findings:-        We discussed the expected course, resolution and complications of the diagnosis(es) in detail. Medication risks, benefits, costs, interactions, and alternatives were discussed as indicated. I advised him to contact the office if his condition worsens, changes or fails to improve as anticipated. He expressed understanding with the diagnosis(es) and plan. Mayco Larose is a 46 y.o. male being evaluated by a video visit encounter for concerns as above. A caregiver was present when appropriate. Due to this being a TeleHealth encounter (During TTYXP-30 public health emergency), evaluation of the following organ systems was limited: Vitals/Constitutional/EENT/Resp/CV/GI//MS/Neuro/Skin/Heme-Lymph-Imm. Pursuant to the emergency declaration under the [facility], [facility]5 waiver authority and the [employer] and [employer] General Act, this Virtual  Visit was conducted, with patient's (and/or legal guardian's) consent, to reduce the patient's risk of exposure to COVID-19 and provide necessary medical care. Services were provided through a video synchronous discussion virtually to substitute for in-person clinic visit.    Patient and provider were located at their individual homes.         Hina Rae, NP

## 2020-08-07 ENCOUNTER — VIRTUAL VISIT (OUTPATIENT)
Dept: FAMILY MEDICINE CLINIC | Age: 40
End: 2020-08-07
Payer: COMMERCIAL

## 2020-08-07 DIAGNOSIS — M10.9 ACUTE GOUT INVOLVING TOE OF LEFT FOOT, UNSPECIFIED CAUSE: Primary | ICD-10-CM

## 2020-08-07 PROCEDURE — 99213 OFFICE O/P EST LOW 20 MIN: CPT | Performed by: NURSE PRACTITIONER

## 2020-08-07 RX ORDER — COLCHICINE 0.6 MG/1
TABLET ORAL
Qty: 10 TAB | Refills: 1 | Status: SHIPPED | OUTPATIENT
Start: 2020-08-07 | End: 2020-12-31

## 2020-08-07 NOTE — PROGRESS NOTES
Identified pt with two pt identifiers(name and ). Reviewed record in preparation for visit and have obtained necessary documentation. Chief Complaint   Patient presents with    Medication Refill        Health Maintenance Due   Topic    Pneumococcal 0-64 years (1 of 1 - PPSV23)    Foot Exam Q1     MICROALBUMIN Q1     Eye Exam Retinal or Dilated     Lipid Screen     DTaP/Tdap/Td series (1 - Tdap)    A1C test (Diabetic or Prediabetic)     Influenza Age 5 to Adult        Coordination of Care Questionnaire:  :   1) Have you been to an emergency room, urgent care, or hospitalized since your last visit? If yes, where when, and reason for visit? no      2. Have seen or consulted any other health care provider since your last visit? If yes, where when, and reason for visit?  no        Patient is accompanied by self I have received verbal consent from Yvonne Rose to discuss any/all medical information while they are present in the room.

## 2020-08-07 NOTE — PROGRESS NOTES
Amalia Keys is a 45 y.o. male who was seen by synchronous (real-time) audio-video technology on 8/7/2020 for Medication Refill and Gout        Assessment & Plan:   Diagnoses and all orders for this visit:    1. Acute gout involving toe of left foot, unspecified cause    Other orders  -     colchicine 0.6 mg tablet; Take 2 tablets together and then one hour later take the 3rd tablet on the first day. After day one take one tablet daily for 3-4 days. Indications: acute inflammation of the joints due to gout attack      Orders Placed This Encounter    colchicine 0.6 mg tablet     Sig: Take 2 tablets together and then one hour later take the 3rd tablet on the first day. After day one take one tablet daily for 3-4 days. Indications: acute inflammation of the joints due to gout attack     Dispense:  10 Tab     Refill:  1     Patient was seen today by virtual visit. Patient reports beginning Monday and had pain in his left great toe with redness. The patient has a history of gout and the pain has continued all week with no relief. The patient does report drinking beer and having red meat. The patient has taken colchicine in the past with no complications. Up-to-date recommends the patient take a loading dose of 3 tablets on day 1 and then 1 tablet the next 3 to 4 days until symptoms resolve. This medication has been prescribed the patient has been advised the not have alcohol red meat or shellfish until symptoms resolve. Patient will follow-up as needed                        I spent at least 15 minutes on this visit with this established patient. Subjective:       Prior to Admission medications    Medication Sig Start Date End Date Taking? Authorizing Provider   colchicine 0.6 mg tablet Take 2 tablets together and then one hour later take the 3rd tablet on the first day. After day one take one tablet daily for 3-4 days.   Indications: acute inflammation of the joints due to gout attack 8/7/20  Yes Enrique Rivera NP   naproxen (NAPROSYN) 500 mg tablet Take 1 Tab by mouth two (2) times daily as needed for Pain. 4/13/20  Yes Lupillo Garcia NP   metFORMIN (GLUCOPHAGE) 500 mg tablet TOME CARLENE TABLETA DOS VECES AL SANAM CON LAS COMIDAS 4/13/20  Yes Lupillo Garcia NP   colchicine 0.6 mg tablet Take 1 Tab by mouth daily as needed (gout flare). 6/5/19 8/7/20  Grupo Duque NP     Patient Active Problem List   Diagnosis Code    Gout M10.9    Obesity E66.9    Type II diabetes mellitus ([address] E11.9    Obesity, morbid ([address] E66.01     Patient Active Problem List    Diagnosis Date Noted    Obesity, morbid ([address] 10/03/2018    Gout 10/01/2018    Obesity 10/01/2018    Type II diabetes mellitus ([address] 10/01/2018     Current Outpatient Medications   Medication Sig Dispense Refill    colchicine 0.6 mg tablet Take 2 tablets together and then one hour later take the 3rd tablet on the first day. After day one take one tablet daily for 3-4 days. Indications: acute inflammation of the joints due to gout attack 10 Tab 1    naproxen (NAPROSYN) 500 mg tablet Take 1 Tab by mouth two (2) times daily as needed for Pain. 30 Tab 2    metFORMIN (GLUCOPHAGE) 500 mg tablet TOME CARLENE TABLETA DOS VECES AL SANAM CON LAS COMIDAS 180 Tab 1     No Known Allergies  Past Medical History:   Diagnosis Date    Gout     Hyperlipemia     Impaired glucose tolerance        Review of Systems   Constitutional: Negative. HENT: Negative. Eyes: Negative. Respiratory: Negative. Cardiovascular: Negative. Gastrointestinal: Negative. Genitourinary: Negative. Musculoskeletal: Negative. Skin:        Gout left great toe   Neurological: Negative. Psychiatric/Behavioral: Negative.         Objective:     Patient-Reported Vitals 8/7/2020   Patient-Reported Weight 245lb   Patient-Reported Height 5ft5in        [INSTRUCTIONS:  \"[x]\" Indicates a positive item  \"[]\" Indicates a negative item  -- DELETE ALL ITEMS NOT EXAMINED]    Constitutional: [x] Appears well-developed and well-nourished [x] No apparent distress      [] Abnormal -     Mental status: [x] Alert and awake  [x] Oriented to person/place/time [x] Able to follow commands    [] Abnormal -     Eyes:   EOM    [x]  Normal    [] Abnormal -   Sclera  [x]  Normal    [] Abnormal -          Discharge [x]  None visible   [] Abnormal -     HENT: [x] Normocephalic, atraumatic  [] Abnormal -   [x] Mouth/Throat: Mucous membranes are moist    External Ears [x] Normal  [] Abnormal -    Neck: [x] No visualized mass [] Abnormal -     Pulmonary/Chest: [x] Respiratory effort normal   [x] No visualized signs of difficulty breathing or respiratory distress        [] Abnormal -      Musculoskeletal:   [x] Normal gait with no signs of ataxia         [x] Normal range of motion of neck        [] Abnormal -     Neurological:        [x] No Facial Asymmetry (Cranial nerve 7 motor function) (limited exam due to video visit)          [x] No gaze palsy        [] Abnormal -          Skin:        [x] No significant exanthematous lesions or discoloration noted on facial skin         [] Abnormal -            Psychiatric:       [x] Normal Affect [] Abnormal -        [x] No Hallucinations    Other pertinent observable physical exam findings:-        We discussed the expected course, resolution and complications of the diagnosis(es) in detail. Medication risks, benefits, costs, interactions, and alternatives were discussed as indicated. I advised him to contact the office if his condition worsens, changes or fails to improve as anticipated. He expressed understanding with the diagnosis(es) and plan. Melvin Armando, who was evaluated through a patient-initiated, synchronous (real-time) audio-video encounter, and/or his healthcare decision maker, is aware that it is a billable service, with coverage as determined by his insurance carrier.  He provided verbal consent to proceed: Yes, and patient identification was verified. It was conducted pursuant to the emergency declaration under the [facility], [address] [place] [address] and the [employer] and [employer] General Act. A caregiver was present when appropriate. Ability to conduct physical exam was limited. I was at home. The patient was at home.       Karron Hodgkin, NP

## 2020-10-08 DIAGNOSIS — E11.9 CONTROLLED TYPE 2 DIABETES MELLITUS WITHOUT COMPLICATION, WITHOUT LONG-TERM CURRENT USE OF INSULIN (HCC): ICD-10-CM

## 2020-10-09 RX ORDER — METFORMIN HYDROCHLORIDE 500 MG/1
TABLET ORAL
Qty: 180 TAB | Refills: 1 | Status: SHIPPED | OUTPATIENT
Start: 2020-10-09 | End: 2021-04-12

## 2020-12-31 RX ORDER — COLCHICINE 0.6 MG/1
TABLET ORAL
Qty: 10 TAB | Refills: 1 | Status: SHIPPED | OUTPATIENT
Start: 2020-12-31

## 2021-04-08 DIAGNOSIS — E11.9 CONTROLLED TYPE 2 DIABETES MELLITUS WITHOUT COMPLICATION, WITHOUT LONG-TERM CURRENT USE OF INSULIN (HCC): ICD-10-CM

## 2021-04-12 RX ORDER — METFORMIN HYDROCHLORIDE 500 MG/1
TABLET ORAL
Qty: 60 TAB | Refills: 1 | Status: SHIPPED | OUTPATIENT
Start: 2021-04-12

## 2021-06-25 LAB
ALBUMIN SERPL-MCNC: 4.1 G/DL (ref 4–5)
ALBUMIN/GLOB SERPL: 1.3 {RATIO} (ref 1.2–2.2)
ALP SERPL-CCNC: 86 IU/L (ref 48–121)
ALT SERPL-CCNC: 18 IU/L (ref 0–44)
AST SERPL-CCNC: 12 IU/L (ref 0–40)
BASOPHILS # BLD AUTO: 0 X10E3/UL (ref 0–0.2)
BASOPHILS NFR BLD AUTO: 0 %
BILIRUB SERPL-MCNC: 0.2 MG/DL (ref 0–1.2)
BUN SERPL-MCNC: 21 MG/DL (ref 6–24)
BUN/CREAT SERPL: 26 (ref 9–20)
CALCIUM SERPL-MCNC: 9.6 MG/DL (ref 8.7–10.2)
CHLORIDE SERPL-SCNC: 98 MMOL/L (ref 96–106)
CO2 SERPL-SCNC: 23 MMOL/L (ref 20–29)
CREAT SERPL-MCNC: 0.81 MG/DL (ref 0.76–1.27)
EOSINOPHIL # BLD AUTO: 0.2 X10E3/UL (ref 0–0.4)
EOSINOPHIL NFR BLD AUTO: 3 %
ERYTHROCYTE [DISTWIDTH] IN BLOOD BY AUTOMATED COUNT: 14.5 % (ref 11.6–15.4)
EST. AVERAGE GLUCOSE BLD GHB EST-MCNC: 134 MG/DL
GLOBULIN SER CALC-MCNC: 3.1 G/DL (ref 1.5–4.5)
GLUCOSE SERPL-MCNC: 137 MG/DL (ref 65–99)
HBA1C MFR BLD: 6.3 % (ref 4.8–5.6)
HCT VFR BLD AUTO: 41.1 % (ref 37.5–51)
HGB BLD-MCNC: 13.8 G/DL (ref 13–17.7)
IMM GRANULOCYTES # BLD AUTO: 0 X10E3/UL (ref 0–0.1)
IMM GRANULOCYTES NFR BLD AUTO: 0 %
LYMPHOCYTES # BLD AUTO: 2 X10E3/UL (ref 0.7–3.1)
LYMPHOCYTES NFR BLD AUTO: 31 %
MCH RBC QN AUTO: 28.3 PG (ref 26.6–33)
MCHC RBC AUTO-ENTMCNC: 33.6 G/DL (ref 31.5–35.7)
MCV RBC AUTO: 84 FL (ref 79–97)
MONOCYTES # BLD AUTO: 0.6 X10E3/UL (ref 0.1–0.9)
MONOCYTES NFR BLD AUTO: 9 %
NEUTROPHILS # BLD AUTO: 3.8 X10E3/UL (ref 1.4–7)
NEUTROPHILS NFR BLD AUTO: 57 %
PLATELET # BLD AUTO: 268 X10E3/UL (ref 150–450)
POTASSIUM SERPL-SCNC: 4.7 MMOL/L (ref 3.5–5.2)
PROT SERPL-MCNC: 7.2 G/DL (ref 6–8.5)
RBC # BLD AUTO: 4.88 X10E6/UL (ref 4.14–5.8)
SODIUM SERPL-SCNC: 140 MMOL/L (ref 134–144)
WBC # BLD AUTO: 6.6 X10E3/UL (ref 3.4–10.8)